# Patient Record
Sex: FEMALE | Race: BLACK OR AFRICAN AMERICAN | NOT HISPANIC OR LATINO | URBAN - METROPOLITAN AREA
[De-identification: names, ages, dates, MRNs, and addresses within clinical notes are randomized per-mention and may not be internally consistent; named-entity substitution may affect disease eponyms.]

---

## 2022-04-25 ENCOUNTER — OFFICE VISIT (OUTPATIENT)
Dept: URBAN - METROPOLITAN AREA CLINIC 48 | Facility: CLINIC | Age: 52
End: 2022-04-25

## 2022-04-25 ENCOUNTER — OFFICE VISIT (OUTPATIENT)
Dept: URBAN - METROPOLITAN AREA CLINIC 48 | Facility: CLINIC | Age: 52
End: 2022-04-25
Payer: COMMERCIAL

## 2022-04-25 ENCOUNTER — DASHBOARD ENCOUNTERS (OUTPATIENT)
Age: 52
End: 2022-04-25

## 2022-04-25 ENCOUNTER — LAB OUTSIDE AN ENCOUNTER (OUTPATIENT)
Dept: URBAN - METROPOLITAN AREA CLINIC 48 | Facility: CLINIC | Age: 52
End: 2022-04-25

## 2022-04-25 VITALS
SYSTOLIC BLOOD PRESSURE: 123 MMHG | WEIGHT: 293 LBS | HEIGHT: 63 IN | BODY MASS INDEX: 51.91 KG/M2 | DIASTOLIC BLOOD PRESSURE: 78 MMHG | TEMPERATURE: 97.4 F | HEART RATE: 84 BPM

## 2022-04-25 DIAGNOSIS — K92.1 HEMATOCHEZIA: ICD-10-CM

## 2022-04-25 PROCEDURE — 99204 OFFICE O/P NEW MOD 45 MIN: CPT | Performed by: INTERNAL MEDICINE

## 2022-04-25 RX ORDER — CETIRIZINE HYDROCHLORIDE 10 MG/1
1 TABLET TABLET, FILM COATED ORAL ONCE A DAY
Status: ACTIVE | COMMUNITY

## 2022-04-25 RX ORDER — HYDROCORTISONE 25 MG/G
1 APPLICATION CREAM TOPICAL TWICE A DAY
Qty: 1 TUBE | Refills: 1 | OUTPATIENT

## 2022-04-25 RX ORDER — IPRATROPIUM BROMIDE 0.5 MG/2.5ML
2.5 ML SOLUTION RESPIRATORY (INHALATION)
Status: ACTIVE | COMMUNITY

## 2022-04-25 RX ORDER — ALBUTEROL SULFATE 90 UG/1
1 PUFF AS NEEDED AEROSOL, METERED RESPIRATORY (INHALATION)
Status: ACTIVE | COMMUNITY

## 2022-04-25 RX ORDER — PHENTERMINE HYDROCHLORIDE 37.5 MG/1
1 CAPSULE CAPSULE ORAL ONCE A DAY
Status: ACTIVE | COMMUNITY

## 2022-04-25 RX ORDER — FLUTICASONE FUROATE AND VILANTEROL 200; 25 UG/1; UG/1
1 PUFF POWDER RESPIRATORY (INHALATION) ONCE A DAY
Status: ACTIVE | COMMUNITY

## 2022-04-25 NOTE — PHYSICAL EXAM CONSTITUTIONAL:
well developed, well nourished ,morbidly obese, in no acute distress , ambulating without difficulty , normal communication ability

## 2022-04-25 NOTE — HPI-TODAY'S VISIT:
51 year old female presents for rectal bleeding. She has had intermittent painless rectal bleeding  in the toilet measuring about 1/2 cup-1 cup at a time that began at the end of March. She has had bleeding in the past. Last colonoscopy was 4-5 years ago and she states it was normal besides internal hemorrhoids. Denies NSAIDs . She does not have menses. The patient notes having "rumbling" in her LLQ that is associated with hematochezia with some clots. Last rectal bleeding was last Thursday. Hgb 3/30/22 was 14, platelets are normal and at that time she had rectal bleeding. WBC since 10/2021 has been 12.2 and she was sent to a hematologist for evaluation. She is to do labwork today. Bowel movements are every 2-3 days of soft stool.  Denies GI disease in the family. She has JEANINE and asthma.

## 2022-05-19 ENCOUNTER — OFFICE VISIT (OUTPATIENT)
Dept: URBAN - METROPOLITAN AREA SURGERY CENTER 27 | Facility: SURGERY CENTER | Age: 52
End: 2022-05-19

## 2022-05-25 PROBLEM — 449341000124102: Status: ACTIVE | Noted: 2022-04-25

## 2022-06-08 ENCOUNTER — OFFICE VISIT (OUTPATIENT)
Dept: URBAN - METROPOLITAN AREA MEDICAL CENTER 35 | Facility: MEDICAL CENTER | Age: 52
End: 2022-06-08

## 2022-06-22 ENCOUNTER — OFFICE VISIT (OUTPATIENT)
Dept: URBAN - METROPOLITAN AREA MEDICAL CENTER 35 | Facility: MEDICAL CENTER | Age: 52
End: 2022-06-22